# Patient Record
(demographics unavailable — no encounter records)

---

## 2024-10-21 NOTE — DISCUSSION/SUMMARY
[Patient] : the patient [EKG obtained to assist in diagnosis and management of assessed problem(s)] : EKG obtained to assist in diagnosis and management of assessed problem(s) [___ Month(s)] : in [unfilled] month(s) [FreeTextEntry1] : 75 y/o female with h/o cad, htn, hl, predm, obesity who presents for f/up today s/p BETH pLAD 6/24  -continue asa, plavix, statin -[facility] 6/24: prox LAD 75%, iFR.88, s/p BETH pLAD, Cx mild diffuse, LPL mild diffuse, RCA, mild diffuse, Ramus mild diffuse -CTA cor 4/24: Cardiac: 1. The calcium score is severe at 673 Agatston units, which is at the 96 percentile, adjusted for age, gender and race. 2. Calcific plaque obscures the lumen in mid LAD, distal LAD, and proximal RCA, unable to rule out significant stenosis in these segments. 3. LPDA and LPL are not well visualized. 4. Remaining coronary segments are non-obstructive.  Non-cardiac: Moderate size hiatus hernia. -DEVAN duplex 4/24: limited by body habitus/breath holding, no e/o stephanie -Echo 3/24: 1. Left ventricular cavity is normal in size. Left ventricular systolic function is normal with an ejection fraction of 65 %. There are no regional wall motion abnormalities seen. 2. Normal left ventricular diastolic function. 3. Normal right ventricular cavity size, with normal wall thickness, and normal systolic function. 4. Mild mitral regurgitation. 5. No pericardial effusion seen. 6. Mild tricuspid regurgitation. 7. Fibrocalcific aortic valve sclerosis without stenosis. 8. Estimated pulmonary artery systolic pressure is 40 mmHg, consistent with mild pulmonary hypertension. 9. Mild left ventricular hypertrophy.  -elevated LpA -f/up w Shannan for weight loss drugs -labs 2024 reviewed, lipids, A1c ordered today -continue norvasc -continue losartan-hctz 100-25 -ekg ordered today - nsr, rbbb, no st/t changes -counseled on cvd risk factors -f/up 6 months for cad  I have spent 30 minutes reviewing labs, records, tests and discussed cvd risk factors, htn, cad, obesity.

## 2024-10-21 NOTE — HISTORY OF PRESENT ILLNESS
[FreeTextEntry1] : 73 y/o female with h/o cad, htn, hl, predm, obesity, gastric bypass surgery, s/p BETH pLAD  who presents for f/up today  last seen   no cp, sob, palpitations, lh, syncope, edema     -LHC : prox LAD 75%, iFR .88, s/p BETH pLAD, Cx mild diffuse, LPL mild diffuse, RCA, mild diffuse, Ramus mild diffuse started on plavix   -CTA cor : Cardiac: 1. The calcium score is severe at 673 Agatston units, which is at the 96 percentile, adjusted for age, gender and race. 2. Calcific plaque obscures the lumen in mid LAD, distal LAD, and proximal RCA, unable to rule out significant stenosis in these segments. 3. LPDA and LPL are not well visualized. 4. Remaining coronary segments are non-obstructive.  Non-cardiac: Moderate size hiatus hernia.   met w Shannan elevated LpA    -DEVAN duplex : limited by body habitus/breath holding, no e/o stephanie -Echo 3/24: 1. Left ventricular cavity is normal in size. Left ventricular systolic function is normal with an ejection fraction of 65 %. There are no regional wall motion abnormalities seen. 2. Normal left ventricular diastolic function. 3. Normal right ventricular cavity size, with normal wall thickness, and normal systolic function. 4. Mild mitral regurgitation. 5. No pericardial effusion seen. 6. Mild tricuspid regurgitation. 7. Fibrocalcific aortic valve sclerosis without stenosis. 8. Estimated pulmonary artery systolic pressure is 40 mmHg, consistent with mild pulmonary hypertension. 9. Mild left ventricular hypertrophy.    started on anti htn in 2023   not exercising due to knee pain diet - working on changes no mental health issues low stress sleep 8 hours   PMH/PSH: cad s/p BETH pLAD  htn obesity arthritis knee surgery gastric bypass predm hl  ALL: nkda  MEDS: asa 81 mg qd losartan/hctz 100/25 mg qd norvasc 10 mg qd plavix 75 mg qd crestor 20 mg qhs  SH: no tobacco no etoh/drugs from  lived [place] 54 years lives alone  daugther - 30  - dm   FH: mother - , hepatitis 86 father - , 90 3 siblings - alive, healthy

## 2025-03-25 NOTE — HISTORY OF PRESENT ILLNESS
[FreeTextEntry1] :     75 y/o female with h/o cad, htn, hl, predm, obesity, gastric bypass surgery, s/p BETH pLAD  who presents for f/up today  last seen 10/24  no cp, sob, palpitations, lh, syncope, edema    -LHC : prox LAD 75%, iFR .88, s/p BETH pLAD, Cx mild diffuse, LPL mild diffuse, RCA, mild diffuse, Ramus mild diffuse started on plavix   -CTA cor : Cardiac: 1. The calcium score is severe at 673 Agatston units, which is at the 96 percentile, adjusted for age, gender and race. 2. Calcific plaque obscures the lumen in mid LAD, distal LAD, and proximal RCA, unable to rule out significant stenosis in these segments. 3. LPDA and LPL are not well visualized. 4. Remaining coronary segments are non-obstructive.  Non-cardiac: Moderate size hiatus hernia.   met w Shannan elevated LpA    -DEVAN duplex : limited by body habitus/breath holding, no e/o stephanie -Echo 3/24: 1. Left ventricular cavity is normal in size. Left ventricular systolic function is normal with an ejection fraction of 65 %. There are no regional wall motion abnormalities seen. 2. Normal left ventricular diastolic function. 3. Normal right ventricular cavity size, with normal wall thickness, and normal systolic function. 4. Mild mitral regurgitation. 5. No pericardial effusion seen. 6. Mild tricuspid regurgitation. 7. Fibrocalcific aortic valve sclerosis without stenosis. 8. Estimated pulmonary artery systolic pressure is 40 mmHg, consistent with mild pulmonary hypertension. 9. Mild left ventricular hypertrophy.  started on anti htn in 2023   not exercising due to knee pain diet - working on changes no mental health issues low stress sleep 8 hours   PMH/PSH: cad s/p BETH pLAD  htn obesity arthritis knee surgery gastric bypass predm hl  ALL: nkda  MEDS: asa 81 mg qd losartan/hctz 100/25 mg qd norvasc 10 mg qd plavix 75 mg qd crestor 20 mg qhs  SH: no tobacco no etoh/drugs from  lived [place] 54 years lives alone  daugther - 30  - dm   FH: mother - , hepatitis 86 father - , 90 3 siblings - alive, healthy

## 2025-03-25 NOTE — DISCUSSION/SUMMARY
[Patient] : the patient [EKG obtained to assist in diagnosis and management of assessed problem(s)] : EKG obtained to assist in diagnosis and management of assessed problem(s) [___ Month(s)] : in [unfilled] month(s) [FreeTextEntry1] : 75 y/o female with h/o cad s/p BETH pLAD 6/24, htn, hl, predm, obesity who presents for f/up today    -continue asa, plavix, statin -[facility] 6/24: prox LAD 75%, iFR.88, s/p BETH pLAD, Cx mild diffuse, LPL mild diffuse, RCA, mild diffuse, Ramus mild diffuse -CTA cor 4/24: Cardiac: 1. The calcium score is severe at 673 Agatston units, which is at the 96 percentile, adjusted for age, gender and race. 2. Calcific plaque obscures the lumen in mid LAD, distal LAD, and proximal RCA, unable to rule out significant stenosis in these segments. 3. LPDA and LPL are not well visualized. 4. Remaining coronary segments are non-obstructive.  Non-cardiac: Moderate size hiatus hernia. -DEVAN duplex 4/24: limited by body habitus/breath holding, no e/o stephanie -Echo 3/24: 1. Left ventricular cavity is normal in size. Left ventricular systolic function is normal with an ejection fraction of 65 %. There are no regional wall motion abnormalities seen. 2. Normal left ventricular diastolic function. 3. Normal right ventricular cavity size, with normal wall thickness, and normal systolic function. 4. Mild mitral regurgitation. 5. No pericardial effusion seen. 6. Mild tricuspid regurgitation. 7. Fibrocalcific aortic valve sclerosis without stenosis. 8. Estimated pulmonary artery systolic pressure is 40 mmHg, consistent with mild pulmonary hypertension. 9. Mild left ventricular hypertrophy. -elevated LpA -f/up w Ashley for weight loss drugs -labs 2024 reviewed, labs ordered today -continue norvasc -continue losartan-hctz 100-25 -ekg ordered today - nsr, 1st avb, rbbb, no st/t changes -counseled on cvd risk factors -f/up 6 months for cad  I have spent 30 minutes reviewing labs, records, tests and discussed cvd risk factors, htn, cad, obesity.